# Patient Record
Sex: FEMALE | Race: WHITE
[De-identification: names, ages, dates, MRNs, and addresses within clinical notes are randomized per-mention and may not be internally consistent; named-entity substitution may affect disease eponyms.]

---

## 2017-02-12 ENCOUNTER — HOSPITAL ENCOUNTER (EMERGENCY)
Dept: HOSPITAL 58 - ED | Age: 27
Discharge: HOME | End: 2017-02-12

## 2017-02-12 VITALS — BODY MASS INDEX: 30.7 KG/M2

## 2017-02-12 VITALS — TEMPERATURE: 99.3 F | SYSTOLIC BLOOD PRESSURE: 143 MMHG | DIASTOLIC BLOOD PRESSURE: 89 MMHG

## 2017-02-12 DIAGNOSIS — S99.921A: ICD-10-CM

## 2017-02-12 DIAGNOSIS — L60.0: Primary | ICD-10-CM

## 2017-02-12 PROCEDURE — 99282 EMERGENCY DEPT VISIT SF MDM: CPT

## 2017-02-12 PROCEDURE — 99283 EMERGENCY DEPT VISIT LOW MDM: CPT

## 2017-02-12 NOTE — ED.PDOC
[id number]: i hurt my toe


Time Seen by Physician: 21:10


Mode of Arrival: Walk-In


Information Source: Patient


Exam Limitations: No limitations


Primary Care Provider: 


SEUN RICHARDS





Nursing and Triage Documentation Reviewed and Agree: Yes





Musculoskeletal Complaint Exam





- Ankle/Foot Complaint/Exam


Location of Injury: Reports: Right, Toe #1


Mechanism of Injury: Reports: Trauma


Onset/Duration: several hours


Symptoms Are: Reports: Still present


Onset of Pain: Reports: Immediate


Initial Severity: Mild


Current Severity: Mild


Location: Reports: Discrete (left great toe)


Character: Reports: Dull


Alleviating: Reports: None


Aggravating: Reports: Movement, Weight bearing


Able to Bear Weight: Yes


Associated Signs and Symptoms: Reports: Swelling


Achilles Tendon Abnormality: No


Tenderness: Present: Digits


Differential Diagnosis: Infection, Closed Fracture, Open Fracture





Review of Systems





- Review Of Systems


Constitutional: Reports: No symptoms


Eyes: Reports: No symptoms


Ears, Nose, Mouth, Throat: Reports: No symptoms


Respiratory: Reports: No symptoms


Cardiac: Reports: No symptoms


GI: Reports: No symptoms


: Reports: No symptoms


Musculoskeletal: Reports: No symptoms


Skin: Reports: No symptoms


Neurological: Reports: No symptoms


Endocrine: Reports: No symptoms


Hematologic/Lymphatic: Reports: No symptoms


All Other Systems: Reviewed and Negative





Past Medical History





- Past Medical History


Previously Healthy: Yes


Endocrine: Reports: Unknown


Cardiovascular: Reports: Unknown


Respiratory: Reports: Unknown


Hematological: Reports: Unknown


Gastrointestinal: Reports: Unknown


Genitourinary: Reports: Unknown


Neuro/Psych: Reports: Unknown


Musculoskeletal: Reports: Unknown


Cancer: Reports: Unknown


Last Menstrual Period: na - has mirena





- Surgical History


General Surgical History: Reports: Unknown





- Family History


Family History: Reports: Unknown





- Social History


Smoking Status: Never smoker


Hx Substance Use: No


Alcohol Screening: None


Lives: With family





- Immunizations


Tetanus Shot up to Date: Yes





Physical Exam





- Physical Exam


Appearance: Well-appearing, No pain distress, Well-nourished


Eyes: DANTE, EOMI, Conjunctiva clear


ENT: Ears normal, Nose normal, Oropharynx normal


Neck: Supple


Respiratory: Airway patent, Breath sounds clear, Breath sounds equal, 

Respirations nonlabored


Cardiovascular: RRR


GI/: Soft


Musculoskeletal: Normal strength, ROM intact, No edema, No calf tenderness


Skin: Warm, Dry, Normal color


Neurological: Sensation intact


Psychiatric: Affect appropriate, Mood appropriate





Interpretation





- Radiology Interpretation


Radiology Interpretation By: Radiologist


Radiology Results: Negative





Critical Care Note





- Critical Care Note


Total Time (mins): 0





Course





- Course


Orders, Labs, Meds: 


Orders











 Category Date Time Status


 


 Cephalexin [Keflex] MEDS  02/12/17 21:43 Discontinued





 500 mg PO ONCE STA   


 


 Ibuprofen Susp [Motrin Susp] MEDS  02/12/17 21:43 Discontinued





 600 mg PO ONCE STA   


 


 TOE(S) MIN 2 VIEWS Stat RADS  02/12/17 21:09 Completed








Medications














Discontinued Medications














Generic Name Dose Route Start Last Admin





  Trade Name Mehdi  PRN Reason Stop Dose Admin


 


Cephalexin  500 mg  02/12/17 21:43  02/12/17 21:47





  Keflex  PO  02/12/17 21:44  500 mg





  ONCE STA   Administration


 


Ibuprofen  600 mg  02/12/17 21:43  02/12/17 21:47





  Motrin Susp  PO  02/12/17 21:44  600 mg





  ONCE STA   Administration











Vital Signs: 


 











  Temp Pulse Resp BP Pulse Ox


 


 02/12/17 21:07  99.3 F  100 H  20  143/89 H  98














Departure





- Departure


Time of Disposition: 21:42


Disposition: HOME SELF-CARE


Discharge Problem: 


 Ingrowing nail





Instructions:  Ingrown Nail (ED)


Condition: Good


Pt referred to PMD for follow-up: Yes


Additional Instructions: 


keflex 500mg bid x 7days---use motrin at home--see your pcp to get referral to 

podiatry


Allergies/Adverse Reactions: 


Allergies





Latex, Natural Rubber Allergy (Verified 02/12/17 21:14)


 








Home Medications: 


Ambulatory Orders





Naproxen 500 mg PO DAILY 02/12/17 








Disposition Discussed With: Patient

## 2017-02-12 NOTE — DI
EXAM:  Three views of the right toes. 

  

History:  Right toe trauma. 

  

Comparison:  Right foot radiograph 07/10/2010 

  

Findings:  No acute fracture or dislocation.  No abnormal calcifications or radiopaque foreign evi
s.  Joint spaces are relatively preserved. Stable mild flattening of the second metatarsal head.  So
ft tissue swelling of the first digit. 

  

Impression:  No acute osseous abnormality. Soft tissue swelling of the first digit.

## 2018-01-02 ENCOUNTER — HOSPITAL ENCOUNTER (EMERGENCY)
Dept: HOSPITAL 58 - ED | Age: 28
Discharge: HOME | End: 2018-01-02

## 2018-01-02 VITALS — TEMPERATURE: 101.7 F | SYSTOLIC BLOOD PRESSURE: 138 MMHG | DIASTOLIC BLOOD PRESSURE: 86 MMHG

## 2018-01-02 VITALS — BODY MASS INDEX: 30.7 KG/M2

## 2018-01-02 DIAGNOSIS — B34.9: ICD-10-CM

## 2018-01-02 DIAGNOSIS — J40: Primary | ICD-10-CM

## 2018-01-02 PROCEDURE — 99283 EMERGENCY DEPT VISIT LOW MDM: CPT

## 2018-01-02 PROCEDURE — 87502 INFLUENZA DNA AMP PROBE: CPT

## 2018-01-02 PROCEDURE — 87651 STREP A DNA AMP PROBE: CPT

## 2018-01-02 NOTE — DI
EXAM: PA and lateral views of the chest 

  

HISTORY:  Cough. 

  

COMPARISON:  None 

  

FINDINGS:  The cardiomediastinal silhouette is normal.  There is no pneumothorax or pleural effusion.
  There is no consolidation, nodule or mass.  The osseous structures are unremarkable. Surgical clips
 in the right upper quadrant. 

  

IMPRESSION:  No acute cardiopulmonary process

## 2018-01-02 NOTE — ED.PDOC
General


ED Provider: 


Dr. BRE MELTON





Chief Complaint: Respiratory Complaint


Stated Complaint: flu like symptoms


Time Seen by Physician: 09:00


Mode of Arrival: Walk-In


Information Source: Patient


Exam Limitations: No limitations


Primary Care Provider: 


KRYSTYNA KEVIN





Nursing and Triage Documentation Reviewed and Agree: Yes


Reviewed sepsis parameters & appropriate labs ordered?: Yes (seen with entire 

nursing staff)


System Inflammatory Response Syndrome: Not Applicable


Sepsis Protocol: 


For patient's 13 years and over:





Temp is 96.8 and below  and greater


Pulse >90 BPM


Resp >20/minute


Acutely Altered Mental Status





Are patient's symptoms suggestive of a new infection, such as:


   -Pneumonia


   -Skin, Soft Tissue


   -Endocarditis


   -UTI


   -Bone, Joint Infection


   -Implantable Device


   -Acute Abdominal Infection


   -Wound Infection


   -Meningitis


   -Blood Stream Catheter Infection


   -Unknown








Respiratory Complaint Exam





- Respiratory Complaint/Exam


Onset/Duration: 2 days


Symptoms Are: Still present


Timing: Intermittent


Initial Severity: Moderate


Current Severity: Moderate


Location: Throat, Chest


Character: Reports: Non-productive cough


Aggravating: Reports: URI


Alleviating: Reports: Spontaneous resolution


Associated Signs and Symptoms: Reports: URI, Nasal congestion.  Denies: Rapid 

breathing, Dyspnea, Fever, Chills, Chest pain, Pleuritic chest pain, Wheezing, 

Hemoptysis, Dizziness, Calf pain, Calf swelling, Edema, Hoarseness, Sinus 

discomfort, Vomiting, Sore throat, Weight loss, Decreased oral intake, 

Increased thirst, Increased appetite, Increased urination


Related History: Reports: Similar episode


History of Healthcare-Acquired Pneumonia: No


Related Surgical History: Reports: None


Pulmonary Embolism Risk Factors: None


Cardiac Risk Factors: Reports: None


Pseudomonas Risk Factors: Reports: None


Tuberculosis Risk Factors: Reports: None


Status Asthmaticus Risk Factors: Reports: None


Home Oxygen Use: No


Recent Stress Test: No


Recent Echo/LV Function: No


Current Antibiotic Use: No


Current Asthma Medication Use: No


Respiratory Distress: None


Inadequate Respiratory Effort: No


Dysphagia Present: No


Stridor Present: No


JVD Present: No


Accessory Muscle Use: No


Retractions: Not Present


Diminished Breath Sounds: No


Sinus Tenderness: None


Grunting Respirations: No


Kussmaul Respirations: No


Differential Diagnoses: Pneumonia, Bronchitis





Review of Systems





- Review Of Systems


Constitutional: Reports: No symptoms


Eyes: Reports: No symptoms


Ears, Nose, Mouth, Throat: Reports: No symptoms


Respiratory: Reports: Cough


Cardiac: Reports: No symptoms


GI: Reports: No symptoms


: Reports: No symptoms


Musculoskeletal: Reports: No symptoms


Skin: Reports: No symptoms


Neurological: Reports: No symptoms


Endocrine: Reports: No symptoms


Hematologic/Lymphatic: Reports: No symptoms


All Other Systems: Reviewed and Negative





Past Medical History





- Past Medical History


Previously Healthy: Yes


Endocrine: Reports: Unknown


Cardiovascular: Reports: Unknown


Respiratory: Reports: Unknown


Hematological: Reports: Unknown


Gastrointestinal: Reports: Unknown


Genitourinary: Reports: Unknown


Neuro/Psych: Reports: Unknown


Musculoskeletal: Reports: Unknown


Cancer: Reports: Unknown


Last Menstrual Period: 9 years ago merana iud





- Surgical History


General Surgical History: Reports: Unknown





- Family History


Family History: Reports: Unknown





- Social History


Smoking Status: Never smoker


Hx Substance Use: No


Alcohol Screening: None





Physical Exam





- Physical Exam


Appearance: Well-appearing, No pain distress, Well-nourished


Eyes: DANTE, EOMI, Conjunctiva clear


ENT: Ears normal, Nose normal, Oropharynx normal


Respiratory: Airway patent, Breath sounds clear, Breath sounds equal, 

Respirations nonlabored


Cardiovascular: RRR, Pulses normal, No rub, No murmur


GI/: Soft, Nontender, No masses, Bowel sounds normal, No Organomegaly


Musculoskeletal: Normal strength, ROM intact, No edema, No calf tenderness


Skin: Warm, Dry, Normal color


Neurological: Sensation intact, Motor intact, Reflexes intact, Cranial nerves 

intact, Alert, Oriented


Psychiatric: Affect appropriate, Mood appropriate





Critical Care Note





- Critical Care Note


Total Time (mins): 0





Course





- Course


Orders, Labs, Meds: 





Orders











 Category Date Time Status


 


 MOLECULAR FLU A/B Stat LAB  01/02/18 09:33 Ordered


 


 MOLECULAR GROUP A STREP Stat LAB  01/02/18 09:33 Ordered


 


 CHEST, 2 VIEWS PA & LAT Stat RADS  01/02/18 09:16 Taken











Vital Signs: 





 











  Temp Pulse Resp BP Pulse Ox


 


 01/02/18 09:01  101.7 F H  100 H  16  138/86  98














Departure





- Departure


Time of Disposition: 09:40


Disposition: HOME SELF-CARE


Discharge Problem: 


 Bronchitis, Viral syndrome





Instructions:  Acute Bronchitis (ED), Bronchospasm (ED), Viral Syndrome (ED)


Condition: Good


Pt referred to PMD for follow-up: Yes


Additional Instructions: 


Please call your Family Physician as soon as possible to schedule a follow-up 

appointment.


Allergies/Adverse Reactions: 


Allergies





Latex, Natural Rubber Allergy (Verified 01/02/18 09:03)


 








Home Medications: 


Ambulatory Orders





1 [No Reported Medications]  01/02/18 








Disposition Discussed With: Patient

## 2021-09-07 LAB
CHOLESTEROL, TOTAL: 199 MG/DL (ref 160–199)
GLUCOSE BLD-MCNC: 114 MG/DL (ref 74–109)
HDLC SERPL-MCNC: 56 MG/DL (ref 65–121)
LDL CHOLESTEROL CALCULATED: 112 MG/DL
TRIGL SERPL-MCNC: 153 MG/DL (ref 0–149)

## 2021-09-28 ENCOUNTER — NURSE TRIAGE (OUTPATIENT)
Dept: OTHER | Facility: CLINIC | Age: 31
End: 2021-09-28

## 2021-09-28 NOTE — TELEPHONE ENCOUNTER
Call came in from Chuy Kendall at the [facility] service center    Duplicate call:  Patient has previously been seen her provider for left shoulder pain. She simply wants to establish care with an in network provider. Warm transfer to Yeoman at the [address] [place] [address] for scheduling.

## 2021-10-04 ENCOUNTER — HOSPITAL ENCOUNTER (OUTPATIENT)
Dept: GENERAL RADIOLOGY | Age: 31
Discharge: HOME | End: 2021-10-04
Payer: COMMERCIAL

## 2021-10-04 ENCOUNTER — OFFICE VISIT (OUTPATIENT)
Dept: PRIMARY CARE CLINIC | Age: 31
End: 2021-10-04
Payer: COMMERCIAL

## 2021-10-04 VITALS
DIASTOLIC BLOOD PRESSURE: 72 MMHG | BODY MASS INDEX: 27.69 KG/M2 | WEIGHT: 166.2 LBS | OXYGEN SATURATION: 98 % | HEART RATE: 81 BPM | SYSTOLIC BLOOD PRESSURE: 118 MMHG | TEMPERATURE: 96.6 F | HEIGHT: 65 IN

## 2021-10-04 DIAGNOSIS — M25.512 CHRONIC LEFT SHOULDER PAIN: Primary | ICD-10-CM

## 2021-10-04 DIAGNOSIS — Z15.09 MONOALLELIC MUTATION OF CHEK2 GENE IN FEMALE PATIENT: ICD-10-CM

## 2021-10-04 DIAGNOSIS — G89.29 CHRONIC LEFT SHOULDER PAIN: Primary | ICD-10-CM

## 2021-10-04 DIAGNOSIS — G89.29 CHRONIC LEFT SHOULDER PAIN: ICD-10-CM

## 2021-10-04 DIAGNOSIS — Z15.02 MONOALLELIC MUTATION OF CHEK2 GENE IN FEMALE PATIENT: ICD-10-CM

## 2021-10-04 DIAGNOSIS — M25.512 CHRONIC LEFT SHOULDER PAIN: ICD-10-CM

## 2021-10-04 DIAGNOSIS — Z15.89 MONOALLELIC MUTATION OF CHEK2 GENE IN FEMALE PATIENT: ICD-10-CM

## 2021-10-04 DIAGNOSIS — Z15.01 MONOALLELIC MUTATION OF CHEK2 GENE IN FEMALE PATIENT: ICD-10-CM

## 2021-10-04 PROCEDURE — 73030 X-RAY EXAM OF SHOULDER: CPT

## 2021-10-04 PROCEDURE — 99203 OFFICE O/P NEW LOW 30 MIN: CPT | Performed by: FAMILY MEDICINE

## 2021-10-04 RX ORDER — METHYLPREDNISOLONE 4 MG/1
TABLET ORAL
Qty: 1 KIT | Refills: 0 | Status: SHIPPED | OUTPATIENT
Start: 2021-10-04 | End: 2021-11-04 | Stop reason: ALTCHOICE

## 2021-10-04 RX ORDER — NAPROXEN 500 MG/1
500 TABLET ORAL 2 TIMES DAILY PRN
Qty: 60 TABLET | Refills: 0 | Status: SHIPPED | OUTPATIENT
Start: 2021-10-04 | End: 2021-11-03

## 2021-10-04 SDOH — ECONOMIC STABILITY: FOOD INSECURITY: WITHIN THE PAST 12 MONTHS, YOU WORRIED THAT YOUR FOOD WOULD RUN OUT BEFORE YOU GOT MONEY TO BUY MORE.: NEVER TRUE

## 2021-10-04 SDOH — ECONOMIC STABILITY: FOOD INSECURITY: WITHIN THE PAST 12 MONTHS, THE FOOD YOU BOUGHT JUST DIDN'T LAST AND YOU DIDN'T HAVE MONEY TO GET MORE.: NEVER TRUE

## 2021-10-04 ASSESSMENT — ENCOUNTER SYMPTOMS
RESPIRATORY NEGATIVE: 1
GASTROINTESTINAL NEGATIVE: 1
EYES NEGATIVE: 1

## 2021-10-04 ASSESSMENT — PATIENT HEALTH QUESTIONNAIRE - PHQ9
SUM OF ALL RESPONSES TO PHQ QUESTIONS 1-9: 0
SUM OF ALL RESPONSES TO PHQ9 QUESTIONS 1 & 2: 0
1. LITTLE INTEREST OR PLEASURE IN DOING THINGS: 0
2. FEELING DOWN, DEPRESSED OR HOPELESS: 0
SUM OF ALL RESPONSES TO PHQ QUESTIONS 1-9: 0
SUM OF ALL RESPONSES TO PHQ QUESTIONS 1-9: 0

## 2021-10-04 ASSESSMENT — SOCIAL DETERMINANTS OF HEALTH (SDOH): HOW HARD IS IT FOR YOU TO PAY FOR THE VERY BASICS LIKE FOOD, HOUSING, MEDICAL CARE, AND HEATING?: NOT HARD AT ALL

## 2021-10-04 NOTE — PROGRESS NOTES
swelling. Neurological: Negative. Hematological: Negative. Psychiatric/Behavioral: Negative. PMHx:  History reviewed. No pertinent past medical history. Patient Active Problem List   Diagnosis    Monoallelic mutation of CHEK2 gene in female patient       PSHx:  Past Surgical History:   Procedure Laterality Date    CHOLECYSTECTOMY  2013    ROTATOR CUFF REPAIR      right rotator cuff surgery       PFHx:  No family history on file. SocialHx:  Social History     Tobacco Use    Smoking status: Never Smoker    Smokeless tobacco: Never Used   Substance Use Topics    Alcohol use: Never       Allergies: Allergies   Allergen Reactions    Latex Rash       Medications:  Current Outpatient Medications   Medication Sig Dispense Refill    methylPREDNISolone (MEDROL DOSEPACK) 4 MG tablet Take by mouth. 1 kit 0    naproxen (NAPROSYN) 500 MG tablet Take 1 tablet by mouth 2 times daily as needed for Pain 60 tablet 0     No current facility-administered medications for this visit. Objective:   PE:  /72   Pulse 81   Temp 96.6 °F (35.9 °C)   Ht 5' 5\" (1.651 m)   Wt 166 lb 3.2 oz (75.4 kg)   SpO2 98%   BMI 27.66 kg/m²   Physical Exam  Vitals and nursing note reviewed. Constitutional:       Appearance: Normal appearance. HENT:      Head: Normocephalic. Right Ear: Tympanic membrane, ear canal and external ear normal.      Left Ear: Tympanic membrane, ear canal and external ear normal.      Ears:      Comments: no ventilation tubes in both ears  Eyes:      Pupils: Pupils are equal, round, and reactive to light. Cardiovascular:      Rate and Rhythm: Normal rate and regular rhythm. Pulses: Normal pulses. Heart sounds: Normal heart sounds. Pulmonary:      Effort: Pulmonary effort is normal.      Breath sounds: Normal breath sounds. Abdominal:      General: Abdomen is flat. Musculoskeletal:         General: No swelling.       Right shoulder: Normal.      Left shoulder: Tenderness and crepitus present. Decreased range of motion ( with abduction and interanl as well as external rotation). Normal strength. Cervical back: Normal range of motion. Skin:     General: Skin is warm and dry. Neurological:      General: No focal deficit present. Mental Status: She is alert. Psychiatric:         Mood and Affect: Mood normal.            Assessment & Plan   Pamela Marcelo was seen today for new patient and shoulder pain. 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 All questions were answered. Medications, including possible adverse effects, and instructions were reviewed and  understanding was confirmed. Follow-up recommendations, including when to contact or return to office (ie; if symptoms worsen or fail to improve), were discussed and acknowledged.     Electronically signed by Shana Gottlieb MD on 10/4/21 at 9:11 AM CDT

## 2021-10-14 ENCOUNTER — HOSPITAL ENCOUNTER (OUTPATIENT)
Dept: MRI IMAGING | Age: 31
Discharge: HOME | End: 2021-10-14
Payer: COMMERCIAL

## 2021-10-14 DIAGNOSIS — M25.512 CHRONIC LEFT SHOULDER PAIN: ICD-10-CM

## 2021-10-14 DIAGNOSIS — G89.29 CHRONIC LEFT SHOULDER PAIN: ICD-10-CM

## 2021-10-14 PROCEDURE — 73221 MRI JOINT UPR EXTREM W/O DYE: CPT

## 2021-10-25 DIAGNOSIS — M25.512 LEFT SHOULDER PAIN, UNSPECIFIED CHRONICITY: Primary | ICD-10-CM

## 2021-10-26 ENCOUNTER — TELEPHONE (OUTPATIENT)
Dept: PRIMARY CARE CLINIC | Age: 31
End: 2021-10-26

## 2021-10-26 NOTE — TELEPHONE ENCOUNTER
----- Message from Perfecto Baez MD sent at 10/25/2021  4:58 PM CDT -----  Please inform pt that MRI shows NO Rotator Cuff xavi. She does have mild bursitis which is from overuse of that shoulder. It goes well with the history she presented with. REST and OTC anti inflammatory should help. If no relief with that, she can come back in for a steroid injection in her shoulder.

## 2021-10-27 ENCOUNTER — TELEPHONE (OUTPATIENT)
Dept: PRIMARY CARE CLINIC | Age: 31
End: 2021-10-27

## 2021-10-28 NOTE — TELEPHONE ENCOUNTER
I am about to leave for the day and I am out tomorrow. Will you call Cecelia back to schedule please? Thank you!

## 2021-11-03 ENCOUNTER — PATIENT MESSAGE (OUTPATIENT)
Dept: PRIMARY CARE CLINIC | Age: 31
End: 2021-11-03

## 2021-11-03 RX ORDER — NAPROXEN 500 MG/1
TABLET ORAL
Qty: 60 TABLET | Refills: 0 | Status: SHIPPED | OUTPATIENT
Start: 2021-11-03 | End: 2021-11-04 | Stop reason: SDUPTHER

## 2021-11-03 NOTE — TELEPHONE ENCOUNTER
Yes, 30 mins is fine. Bring her in tomorrow.  [facility]

## 2021-11-03 NOTE — TELEPHONE ENCOUNTER
From: Tish Buckley  To: Megan Cee MD  Sent: 11/3/2021 11:58 AM CDT  Subject: Non-Urgent Medical Question    I'm following up from a phone conversation last week with 's office about the injection for my shoulder and getting that scheduled? I'm finding no relief with the anti-inflammatory. Thank you!

## 2021-11-04 ENCOUNTER — OFFICE VISIT (OUTPATIENT)
Dept: PRIMARY CARE CLINIC | Age: 31
End: 2021-11-04
Payer: COMMERCIAL

## 2021-11-04 VITALS
OXYGEN SATURATION: 99 % | HEART RATE: 82 BPM | BODY MASS INDEX: 28.06 KG/M2 | SYSTOLIC BLOOD PRESSURE: 118 MMHG | DIASTOLIC BLOOD PRESSURE: 70 MMHG | WEIGHT: 168.4 LBS | TEMPERATURE: 97.5 F | HEIGHT: 65 IN

## 2021-11-04 DIAGNOSIS — M77.8 LEFT SHOULDER TENDINITIS: Primary | ICD-10-CM

## 2021-11-04 PROCEDURE — 99213 OFFICE O/P EST LOW 20 MIN: CPT | Performed by: FAMILY MEDICINE

## 2021-11-04 PROCEDURE — 20610 DRAIN/INJ JOINT/BURSA W/O US: CPT | Performed by: FAMILY MEDICINE

## 2021-11-04 RX ORDER — NAPROXEN 500 MG/1
TABLET ORAL
Qty: 60 TABLET | Refills: 0 | Status: SHIPPED | OUTPATIENT
Start: 2021-11-04

## 2021-11-04 ASSESSMENT — ENCOUNTER SYMPTOMS
EYES NEGATIVE: 1
GASTROINTESTINAL NEGATIVE: 1
RESPIRATORY NEGATIVE: 1

## 2021-11-04 NOTE — PROGRESS NOTES
[facility] [address] [facility]  [address] [address] [place] [address]  Dept: [phone]  Dept Fax: [phone]  Loc: [phone]      Subjective:     Chief Complaint   Patient presents with    Shoulder Pain     patient is here for injection in left shoulder       HPI:  Mini Reynoso is a 27 y.o. female presents today for L shoulder injection. She has been experiencing chronic L shoulder pain. X-ray was normal. MRI revealed mild tendinitis. ROS:   Review of Systems   Constitutional: Negative. HENT: Negative. Eyes: Negative. Respiratory: Negative. Cardiovascular: Negative. Gastrointestinal: Negative. Endocrine: Negative. Genitourinary: Negative. Musculoskeletal: Positive for arthralgias (L shoulder pain L ). Negative for joint swelling. Neurological: Negative. Hematological: Negative. Psychiatric/Behavioral: Negative. PMHx:  No past medical history on file. Patient Active Problem List   Diagnosis    Monoallelic mutation of CHEK2 gene in female patient       PSHx:  Past Surgical History:   Procedure Laterality Date    CHOLECYSTECTOMY  2013    ROTATOR CUFF REPAIR      right rotator cuff surgery       PFHx:  No family history on file. SocialHx:  Social History     Tobacco Use    Smoking status: Never Smoker    Smokeless tobacco: Never Used   Substance Use Topics    Alcohol use: Never       Allergies: Allergies   Allergen Reactions    Latex Rash     Yes, 30 mins is fine. Bring her in tomorrow. We have an opening in our schedule in the afternnoon  Medications:  Current Outpatient Medications   Medication Sig Dispense Refill    naproxen (NAPROSYN) 500 MG tablet TAKE 1 TABLET BY MOUTH TWICE DAILY AS NEEDED FOR PAIN 60 tablet 0    methylPREDNISolone (MEDROL DOSEPACK) 4 MG tablet Take by mouth. (Patient not taking: Reported on 11/4/2021) 1 kit 0     No current facility-administered medications for this visit.        Objective: PE:  /70   Pulse 82   Temp 97.5 °F (36.4 °C)   Ht 5' 5\" (1.651 m)   Wt 168 lb 6.4 oz (76.4 kg)   SpO2 99%   BMI 28.02 kg/m²   Physical Exam  Vitals and nursing note reviewed. Constitutional:       Appearance: Normal appearance. Musculoskeletal:      Right shoulder: Normal.      Left shoulder: Tenderness present. No swelling, effusion or bony tenderness. Decreased range of motion. Neurological:      Mental Status: She is alert. The rest of PE essentially normal      Assessment & Plan   Shell Faith was seen today for shoulder pain. Diagnoses and all orders for this visit:    Left shoulder tendinitis  -     naproxen (NAPROSYN) 500 MG tablet; TAKE 1 TABLET BY MOUTH TWICE DAILY AS NEEDED FOR PAIN      Steroid injection today    Return if symptoms worsen or fail to improve. All questions were answered. Medications, including possible adverse effects, and instructions were reviewed and  understanding was confirmed. Follow-up recommendations, including when to contact or return to office (ie; if symptoms worsen or fail to improve), were discussed and acknowledged. Electronically signed by Radha Goins MD on 11/4/21 at 9:35 AM CDT  Shoulder Injection Procedure Note    Pre-operative Diagnosis: L shoulder tendinitis  Post-operative Diagnosis: same    Indications: chronic pain and limited mobility    Anesthesia: Lidocaine 1% with epinephrine without added sodium bicarbonate    Procedure Details     Verbal consent was obtained for the procedure. The shoulder was prepped with iodine and the skin was anesthetized. Using a 22 gauge needle the glenohumeral joint is injected with 1.5  mL 1% lidocaine and 1 mL of triamcinolone (KENALOG) 40mg/ml under the posterior aspect of the acromion. The injection site was cleansed with topical isopropyl alcohol and a dressing was applied. Pt instructed to avoid strenuous activity using L shoulder in the next 48 hours.      Complications:  None; patient tolerated the procedure well.